# Patient Record
Sex: FEMALE | Race: WHITE | Employment: OTHER | URBAN - METROPOLITAN AREA
[De-identification: names, ages, dates, MRNs, and addresses within clinical notes are randomized per-mention and may not be internally consistent; named-entity substitution may affect disease eponyms.]

---

## 2017-04-06 PROBLEM — T50.901A OVERDOSE: Status: ACTIVE | Noted: 2017-04-06

## 2017-04-06 PROBLEM — J96.90 RESPIRATORY FAILURE (HCC): Status: ACTIVE | Noted: 2017-04-06

## 2017-04-06 PROBLEM — E87.20 METABOLIC ACIDOSIS: Status: ACTIVE | Noted: 2017-04-06

## 2017-07-08 ENCOUNTER — HOSPITAL ENCOUNTER (INPATIENT)
Age: 57
LOS: 3 days | Discharge: HOME | DRG: 885 | End: 2017-07-11
Attending: PSYCHIATRY & NEUROLOGY | Admitting: PSYCHIATRY & NEUROLOGY
Payer: COMMERCIAL

## 2017-07-08 PROCEDURE — 65220000003 HC RM SEMIPRIVATE PSYCH

## 2017-07-08 PROCEDURE — 74011250637 HC RX REV CODE- 250/637: Performed by: PSYCHIATRY & NEUROLOGY

## 2017-07-08 RX ORDER — LOSARTAN POTASSIUM 50 MG/1
100 TABLET ORAL DAILY
Status: DISCONTINUED | OUTPATIENT
Start: 2017-07-09 | End: 2017-07-11 | Stop reason: HOSPADM

## 2017-07-08 RX ORDER — IBUPROFEN 600 MG/1
600 TABLET ORAL
Status: DISCONTINUED | OUTPATIENT
Start: 2017-07-08 | End: 2017-07-11 | Stop reason: HOSPADM

## 2017-07-08 RX ORDER — FLUOXETINE HYDROCHLORIDE 20 MG/1
20 CAPSULE ORAL DAILY
Status: DISCONTINUED | OUTPATIENT
Start: 2017-07-09 | End: 2017-07-09

## 2017-07-08 RX ORDER — HYDROCHLOROTHIAZIDE 25 MG/1
12.5 TABLET ORAL DAILY
Status: DISCONTINUED | OUTPATIENT
Start: 2017-07-09 | End: 2017-07-11 | Stop reason: HOSPADM

## 2017-07-08 RX ORDER — BENZTROPINE MESYLATE 1 MG/1
1-2 TABLET ORAL
Status: DISCONTINUED | OUTPATIENT
Start: 2017-07-08 | End: 2017-07-11 | Stop reason: HOSPADM

## 2017-07-08 RX ORDER — LEVOTHYROXINE SODIUM 88 UG/1
88 TABLET ORAL
Status: DISCONTINUED | OUTPATIENT
Start: 2017-07-09 | End: 2017-07-11 | Stop reason: HOSPADM

## 2017-07-08 RX ORDER — CLONIDINE HYDROCHLORIDE 0.1 MG/1
0.1 TABLET ORAL 3 TIMES DAILY
Status: DISCONTINUED | OUTPATIENT
Start: 2017-07-08 | End: 2017-07-11 | Stop reason: HOSPADM

## 2017-07-08 RX ORDER — BENZTROPINE MESYLATE 1 MG/ML
1-2 INJECTION INTRAMUSCULAR; INTRAVENOUS
Status: DISCONTINUED | OUTPATIENT
Start: 2017-07-08 | End: 2017-07-11 | Stop reason: HOSPADM

## 2017-07-08 RX ORDER — HALOPERIDOL 5 MG/ML
5 INJECTION INTRAMUSCULAR
Status: DISCONTINUED | OUTPATIENT
Start: 2017-07-08 | End: 2017-07-11 | Stop reason: HOSPADM

## 2017-07-08 RX ORDER — HALOPERIDOL 5 MG/1
5 TABLET ORAL
Status: DISCONTINUED | OUTPATIENT
Start: 2017-07-08 | End: 2017-07-11 | Stop reason: HOSPADM

## 2017-07-08 RX ORDER — LORAZEPAM 2 MG/ML
1-2 INJECTION INTRAMUSCULAR
Status: DISCONTINUED | OUTPATIENT
Start: 2017-07-08 | End: 2017-07-11 | Stop reason: HOSPADM

## 2017-07-08 RX ORDER — TRAZODONE HYDROCHLORIDE 50 MG/1
50 TABLET ORAL
Status: DISCONTINUED | OUTPATIENT
Start: 2017-07-08 | End: 2017-07-09

## 2017-07-08 RX ORDER — LORAZEPAM 1 MG/1
1-2 TABLET ORAL
Status: DISCONTINUED | OUTPATIENT
Start: 2017-07-08 | End: 2017-07-09

## 2017-07-08 RX ORDER — ATORVASTATIN CALCIUM 20 MG/1
40 TABLET, FILM COATED ORAL
Status: DISCONTINUED | OUTPATIENT
Start: 2017-07-08 | End: 2017-07-11 | Stop reason: HOSPADM

## 2017-07-08 RX ADMIN — LORAZEPAM 1 MG: 1 TABLET ORAL at 20:39

## 2017-07-08 RX ADMIN — CLONIDINE HYDROCHLORIDE 0.1 MG: 0.1 TABLET ORAL at 20:39

## 2017-07-08 RX ADMIN — TRAZODONE HYDROCHLORIDE 50 MG: 50 TABLET ORAL at 21:13

## 2017-07-08 RX ADMIN — ATORVASTATIN CALCIUM 40 MG: 20 TABLET, FILM COATED ORAL at 20:39

## 2017-07-08 NOTE — BH NOTES
GROUP THERAPY PROGRESS NOTE    Kelsey Skiff is participating in Expressive   relaxation and Movie. Group time: 50 minutes    Personal goal for participation: Watching a Movie     Goal orientation: relaxation    Group therapy participation: active and minimal    Therapeutic interventions reviewed and discussed: She appeared to enjoy talking to her peers and socializing with her peers. Impression of participation: The above pt appeared to enjoy watching the movie with her peers during this group.

## 2017-07-08 NOTE — BH NOTES
Patient arrived to unit ambulatory alert and oriented X 4, accompanied by daughter and hospital . Patient searched for contrabands, patient rights was explained and she verbalized understanding. Patient was cooperative with admission process, patient's concern was about her increased level of anxiety daily and at night time, patient stated that she thought she could handle her increased anxiety by living alone, patient also stated that the medication that was prescribed at [facility] in April has not been effective and she weaning off it now to continue Prozac. Chantelle Diamond Patient denies thoughts to harm self or others and contracted to safety. Unit tour done and unit rules and guidelines explained, patient verbalized understanding. Patient was encouraged to utilize non slip socks for safety while ambulating, patient remained in the day area to watch TV for the remaining part of the shift. Will continue to monitor for safety and location.

## 2017-07-08 NOTE — IP AVS SNAPSHOT
[address] [place] [address] Patient: Lesley Zaldivar MRN: [id number] OXT:3/17/[id number] You are allergic to the following No active allergies Recent Documentation Height Weight BMI Smoking Status 1.626 m 79.4 kg 30.04 kg/m2 Never Smoker Emergency Contacts Name Discharge Info Relation Home Work Mobile Nohelia HERNANDEZ CAREGIVER [4] Child [2] [phone] Derik Mcneal  Boyfriend [17] [phone] Brigida Potts  Son [22] [phone] About your hospitalization You were admitted on:  July 8, 2017 You last received care in the:  [address] [facility] 1 ADULT CHEM DEP You were discharged on:  July 11, 2017 Unit phone number:  [phone] Why you were hospitalized Your primary diagnosis was: Major Depressive Disorder, Recurrent Episode (Hcc) Providers Seen During Your Hospitalizations Provider Role Specialty Primary office phone Gopi Solis MD Attending Provider Psychiatry [phone] Your Primary Care Physician (PCP) Primary Care Physician Office Phone Office Fax Memory N[facility] [address] Follow-up Information Follow up With Details Comments Contact Info Ayo Lewis, [address] [facility] [address] 
[phone] [address] [place] [address] On 7/13/2017 at 1:00 pm with Ms. Becerra Christiano LOPEZ for medication [address] [place] [phone] 
[phone] [address] [place] [address] On 7/21/2017 at 1:00 pm for therapy with Ms. Gus Castle [address] [place] [phone] 
[phone] Current Discharge Medication List  
  
START taking these medications Dose & Instructions Dispensing Information Comments Morning Noon Evening Bedtime hydrOXYzine pamoate 50 mg capsule Commonly known as:  VISTARIL Your last dose was: Your next dose is:    
   
   
 Dose:  50 mg Take 1 Cap by mouth three (3) times daily as needed for Anxiety. Indications: anxiety Quantity:  90 Cap Refills:  0 CONTINUE these medications which have CHANGED Dose & Instructions Dispensing Information Comments Morning Noon Evening Bedtime FLUoxetine 40 mg capsule Commonly known as:  PROzac What changed:   
- medication strength 
- how much to take Your last dose was: Your next dose is:    
   
   
 Dose:  40 mg Take 1 Cap by mouth daily. Indications: depression Quantity:  30 Cap Refills:  0  
     
   
   
   
  
 traZODone 150 mg tablet Commonly known as:  Rayma Medal What changed:   
- medication strength 
- how much to take - when to take this 
- reasons to take this Your last dose was: Your next dose is:    
   
   
 Dose:  150 mg Take 1 Tab by mouth nightly as needed for Sleep. Indications: insomnia associated with depression Quantity:  30 Tab Refills:  0 CONTINUE these medications which have NOT CHANGED Dose & Instructions Dispensing Information Comments Morning Noon Evening Bedtime  
 atorvastatin 40 mg tablet Commonly known as:  LIPITOR Your last dose was: Your next dose is:    
   
   
 Dose:  40 mg Take 40 mg by mouth daily. Refills:  0  
     
   
   
   
  
 cloNIDine HCl 0.1 mg tablet Commonly known as:  CATAPRES Your last dose was: Your next dose is:    
   
   
 Dose:  0.1 mg Take 0.1 mg by mouth three (3) times daily. Refills:  0  
     
   
   
   
  
 levothyroxine 88 mcg tablet Commonly known as:  SYNTHROID Your last dose was: Your next dose is:    
   
   
 Dose:  88 mcg Take 88 mcg by mouth Daily (before breakfast). Refills:  0 losartan-hydroCHLOROthiazide 100-12.5 mg per tablet Commonly known as:  HYZAAR Your last dose was: Your next dose is:    
   
   
 Dose:  1 Tab Take 1 Tab by mouth daily. Refills:  0 STOP taking these medications   
 escitalopram oxalate 10 mg tablet Commonly known as:  Jennifer Holt Where to Get Your Medications Information on where to get these meds will be given to you by the nurse or doctor. ! Ask your nurse or doctor about these medications FLUoxetine 40 mg capsule  
 hydrOXYzine pamoate 50 mg capsule  
 traZODone 150 mg tablet Discharge Instructions Your health and safety is very important to us; we remind you to commit to safety and recovery. Should you have any thoughts of harming yourself or others please dial 911, utilize your support systems, the coping strategies you have learned as well as the [address] [place] [address] at [phone] or visit their website at [email or web address]/ The following are some additional coping strategies that you can utilize if you start to feel anxious, angry, stressed or depressed 1. Exercise (running, walking, etc.). 2. Write (poetry, stories, journal). 3. Be with other people. 4. Watch a favorite TV show. 5. Practice Mindfulness 6. Watch a funny/favorite movie 7. Do some deep breathing 8. Take a hot shower or relaxing bath. 9. Play with a pet. 10. Help others 11. Read a good book. 12. Listen to music. 13. Try some aromatherapy (candle, lotion, room spray). 14. Meditate. 15. Paint or draw. 16. Rip paper into itty-bitty pieces. 17. Shoot hoops, kick a ball. 18. Hug a pillow or stuffed animal. 
19. Dance. 20. Take up a new hobby. 21. [place]. 25. Make a list of blessings in your life. 23. Contact a hotline/ your therapist. 
24. Talk to someone close to you. 25. Yoga. 32. Martha Cordova a friend or family member. 32. Make a list of goals for the week/month/year/5 years. BEHAVIORAL HEALTH NURSING DISCHARGE NOTE PATIENT INSTRUCTIONS: 
 
Diet: Regular The discharge information has been reviewed with the patient. The patient verbalized understanding. Patient armband removed and shredded. Discharge Orders None Introducing [facility]! Devorah Schmitt introduces Nonp[employer]il patient portal. Now you can access parts of your medical record, email your doctor's office, and request medication refills online. 1. In your internet browser, go to [email or web address] 2. Click on the First Time User? Click Here link in the Sign In box. You will see the New Member Sign Up page. 3. Enter your Nonpareil Access Code exactly as it appears below. You will not need to use this code after youve completed the sign-up process. If you do not sign up before the expiration date, you must request a new code. · Non[employer]il Access Code: [email or web address] Expires: 10/8/2017 11:24 AM 
 
4. Enter the last four digits of your Social Security Number (xxxx) and Date of Birth (mm/dd/yyyy) as indicated and click Submit. You will be taken to the next sign-up page. 5. Create a Nonpareil ID. This will be your Nonpareil login ID and cannot be changed, so think of one that is secure and easy to remember. 6. Create a Nonpareil password. You can change your password at any time. 7. Enter your Password Reset Question and Answer. This can be used at a later time if you forget your password. 8. Enter your e-mail address. You will receive e-mail notification when new information is available in [address] 9. Click Sign Up. You can now view and download portions of your medical record. 10. Click the Download Summary menu link to download a portable copy of your medical information. If you have questions, please visit the Frequently Asked Questions section of the Nonpareil website.  Remember, Nonp[employer]il is NOT to be used for urgent needs. For medical emergencies, dial 911. Now available from your iPhone and Android! General Information Please provide this summary of care documentation to your next provider. Patient Signature:  ____________________________________________________________ Date:  ____________________________________________________________  
  
Fayrene Retort Provider Signature:  ____________________________________________________________ Date:  ____________________________________________________________

## 2017-07-09 PROBLEM — F33.9 MAJOR DEPRESSIVE DISORDER, RECURRENT EPISODE (HCC): Status: ACTIVE | Noted: 2017-07-09

## 2017-07-09 PROCEDURE — 74011250637 HC RX REV CODE- 250/637: Performed by: PSYCHIATRY & NEUROLOGY

## 2017-07-09 PROCEDURE — 65220000003 HC RM SEMIPRIVATE PSYCH

## 2017-07-09 RX ORDER — FLUOXETINE HYDROCHLORIDE 20 MG/1
20 CAPSULE ORAL DAILY
Status: COMPLETED | OUTPATIENT
Start: 2017-07-09 | End: 2017-07-09

## 2017-07-09 RX ORDER — HYDROXYZINE PAMOATE 50 MG/1
50 CAPSULE ORAL
Status: DISCONTINUED | OUTPATIENT
Start: 2017-07-09 | End: 2017-07-11 | Stop reason: HOSPADM

## 2017-07-09 RX ORDER — FLUOXETINE HYDROCHLORIDE 20 MG/1
40 CAPSULE ORAL DAILY
Status: DISCONTINUED | OUTPATIENT
Start: 2017-07-10 | End: 2017-07-11 | Stop reason: HOSPADM

## 2017-07-09 RX ADMIN — ATORVASTATIN CALCIUM 40 MG: 20 TABLET, FILM COATED ORAL at 20:21

## 2017-07-09 RX ADMIN — FLUOXETINE HYDROCHLORIDE 20 MG: 20 CAPSULE ORAL at 09:00

## 2017-07-09 RX ADMIN — LEVOTHYROXINE SODIUM 88 MCG: 88 TABLET ORAL at 06:52

## 2017-07-09 RX ADMIN — LORAZEPAM 1 MG: 1 TABLET ORAL at 04:34

## 2017-07-09 RX ADMIN — FLUOXETINE 20 MG: 20 CAPSULE ORAL at 11:21

## 2017-07-09 RX ADMIN — LOSARTAN POTASSIUM 100 MG: 50 TABLET, FILM COATED ORAL at 08:20

## 2017-07-09 RX ADMIN — HYDROCHLOROTHIAZIDE 12.5 MG: 25 TABLET ORAL at 08:20

## 2017-07-09 RX ADMIN — CLONIDINE HYDROCHLORIDE 0.1 MG: 0.1 TABLET ORAL at 20:21

## 2017-07-09 RX ADMIN — CLONIDINE HYDROCHLORIDE 0.1 MG: 0.1 TABLET ORAL at 08:18

## 2017-07-09 RX ADMIN — CLONIDINE HYDROCHLORIDE 0.1 MG: 0.1 TABLET ORAL at 14:57

## 2017-07-09 RX ADMIN — TRAZODONE HYDROCHLORIDE 150 MG: 100 TABLET ORAL at 22:07
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
## 2017-07-09 NOTE — BH NOTES
GROUP THERAPY PROGRESS NOTE    Stiven Farrell is participating in Recreational Therapy. Group time: 80    Personal goal for participation: fresh air break or recreation on the unit    Goal orientation: social    Group therapy participation: active    Therapeutic interventions reviewed and discussed: Staff encouraged Pt to get off the unit and go outside and get some fresh air, or play games on the unit with peers. Impression of participation:  Pt. chose to stay on the unit, play games with peers, color joshua patterns or watch a movie.

## 2017-07-09 NOTE — BH NOTES
SHAYLA Notes: pt was in complaint with vitals, med's, and  Ate 100% of meals. Pt participated in groups shift and was able to express her goals. Pt was isolated upon approach and will communicate upon approach. Pt was not a management problem and will continue to monitored for safety precautions and location.

## 2017-07-09 NOTE — BH NOTES
GROUP THERAPY PROGRESS NOTE    Jaclyn Martin is participating in [place].      Group time: 15 minutes    Personal goal for participation: talk w/md      Goal orientation: community    Group therapy participation: active    Therapeutic interventions reviewed and discussed: rules and regulation

## 2017-07-09 NOTE — H&P
History and Physical        Patient: Kelsey Skiff               Sex: female          DOA: 7/8/2017         YOB: 1960      Age:  59 y.o.        LOS:  LOS: 1 day        HPI:     Kelsey Skiff is a 59 y.o. female who was admitted experiencing depression and suicidal ideation after being found with a knife threatening to kill herself. Principal Problem:    Major depressive disorder, recurrent episode ([address] (7/9/2017)        Past Medical History:   Diagnosis Date    Hypercholesteremia     Hypertension     Psychiatric disorder        No past surgical history on file. No family history on file. Social History     Social History    Marital status:      Spouse name: N/A    Number of children: 2    Years of education:      Social History Main Topics    Smoking status: Never Smoker    Smokeless tobacco: Never Used    Alcohol use Yes    Drug use: No    Sexual activity: Not on file     Other Topics Concern    Not on file     Social History Narrative    Patient states she lives with her significant other. States her appetite and sleep are poor. She is a retired . Prior to Admission medications    Medication Sig Start Date End Date Taking? Authorizing Provider   losartan-hydroCHLOROthiazide (HYZAAR) 100-12.5 mg per tablet Take 1 Tab by mouth daily. Jay Sim MD   FLUoxetine (PROZAC) 20 mg capsule Take 20 mg by mouth daily. Jay Sim MD   atorvastatin (LIPITOR) 40 mg tablet Take 40 mg by mouth daily. Jay Sim MD   cloNIDine HCl (CATAPRES) 0.1 mg tablet Take 0.1 mg by mouth three (3) times daily. Jay Sim MD   traZODone (DESYREL) 50 mg tablet Take 50 mg by mouth nightly. Jay Sim MD   escitalopram oxalate (LEXAPRO) 10 mg tablet Take 10 mg by mouth daily. Jay Sim MD   levothyroxine (SYNTHROID) 88 mcg tablet Take 88 mcg by mouth Daily (before breakfast).     Jay Sim MD       No Known Allergies    Review of Systems  A comprehensive review of systems was negative. Requesting referral for eye/vision care. Physical Exam:      Visit Vitals    BP (!) 137/97    Pulse 85    Temp 97.3 °F (36.3 °C)    Resp 15    Ht 5' 4\" (1.626 m)    Wt 175 lb (79.4 kg)    BMI 30.04 kg/m2       Physical Exam:    General:  Alert, cooperative, well developed, well nourished,  female, no distress, appears stated age. Eyes:  Conjunctivae/corneas clear. PERRL, EOMs intact. Fundi benign   Ears:  Normal TMs and external ear canals both ears. Nose: Nares normal. Septum midline. Mucosa normal. No drainage or sinus tenderness. Mouth/Throat: Lips, mucosa, and tongue normal. Teeth and gums normal.   Neck: Supple, symmetrical, trachea midline, no adenopathy, thyroid: no enlargement/tenderness/nodules, no carotid bruit and no JVD. Back:   Symmetric, no curvature. ROM normal. No CVA tenderness. Lungs:   Clear to auscultation bilaterally. Heart:  Regular rate and rhythm, S1, S2 normal, no murmur, click, rub or gallop. Abdomen:   Soft, non-tender. Bowel sounds normal. No masses,  No organomegaly. Extremities: Extremities normal, atraumatic, no cyanosis or edema. Pulses: 2+ and symmetric all extremities. Skin: Skin color, texture, turgor normal. No rashes or lesions   Lymph nodes: Cervical, supraclavicular, and axillary nodes normal.   Neurologic: CNII-XII intact. Normal strength, sensation and reflexes throughout.              Assessment/Plan     Depression  Suicidal ideation  Hopeless  Labs reviewed  Continue treatment per physician's orders

## 2017-07-10 LAB — TSH SERPL DL<=0.05 MIU/L-ACNC: 2.25 UIU/ML (ref 0.36–3.74)

## 2017-07-10 PROCEDURE — 65220000003 HC RM SEMIPRIVATE PSYCH

## 2017-07-10 PROCEDURE — 84443 ASSAY THYROID STIM HORMONE: CPT | Performed by: PSYCHIATRY & NEUROLOGY

## 2017-07-10 PROCEDURE — 74011250637 HC RX REV CODE- 250/637: Performed by: PSYCHIATRY & NEUROLOGY

## 2017-07-10 PROCEDURE — 36415 COLL VENOUS BLD VENIPUNCTURE: CPT | Performed by: PSYCHIATRY & NEUROLOGY

## 2017-07-10 RX ADMIN — FLUOXETINE 40 MG: 20 CAPSULE ORAL at 08:21

## 2017-07-10 RX ADMIN — HYDROXYZINE PAMOATE 50 MG: 50 CAPSULE ORAL at 17:30

## 2017-07-10 RX ADMIN — ATORVASTATIN CALCIUM 40 MG: 20 TABLET, FILM COATED ORAL at 20:36

## 2017-07-10 RX ADMIN — TRAZODONE HYDROCHLORIDE 150 MG: 100 TABLET ORAL at 20:44

## 2017-07-10 RX ADMIN — CLONIDINE HYDROCHLORIDE 0.1 MG: 0.1 TABLET ORAL at 20:36

## 2017-07-10 RX ADMIN — CLONIDINE HYDROCHLORIDE 0.1 MG: 0.1 TABLET ORAL at 13:44

## 2017-07-10 RX ADMIN — HYDROXYZINE PAMOATE 50 MG: 50 CAPSULE ORAL at 11:34

## 2017-07-10 RX ADMIN — HYDROXYZINE PAMOATE 50 MG: 50 CAPSULE ORAL at 01:05

## 2017-07-10 RX ADMIN — LOSARTAN POTASSIUM 100 MG: 50 TABLET, FILM COATED ORAL at 08:21

## 2017-07-10 RX ADMIN — CLONIDINE HYDROCHLORIDE 0.1 MG: 0.1 TABLET ORAL at 08:22

## 2017-07-10 RX ADMIN — HYDROCHLOROTHIAZIDE 12.5 MG: 25 TABLET ORAL at 08:20

## 2017-07-10 RX ADMIN — LEVOTHYROXINE SODIUM 88 MCG: 88 TABLET ORAL at 06:34

## 2017-07-10 NOTE — BSMART NOTE
SW ENCOUNTER: The SW and doctor met with the patient to assess progress and needs. The patient expressed that she has been having difficulty with sleeping due to not being in a peaceful environment. The patient was unsure of the effectiveness of her medications; the doctor addressed her concerns. The patient plans to return home upon discharged and requested a need for outpatient therapy. The patient denied current SI/HI, intent and AVH.

## 2017-07-10 NOTE — PROGRESS NOTES
Problem: Suicide/Homicide (Adult/Pediatric)  Goal: *STG: Remains safe in hospital  Patient will remain safe daily while hospitalized. Outcome: Progressing Towards Goal  Patient demonstrated safe behavior; monitored for safety per protocol. Goal: *STG/LTG: Complies with medication therapy  Outcome: Progressing Towards Goal  Patient is medication compliant. Problem: Falls - Risk of  Goal: *Absence of falls  Outcome: Progressing Towards Goal  Patient remains free from falls. Comments:   Patient is alert and oriented x 4; pleasant and cooperative; denied thoughts of harm to self or others; contracts for safety; stated that she's here d/t to anxiety and sleeplessness; anxious affect and mood; stated that she had a good visit with her significant other this evening; with continue to monitor and maintain safe environment.

## 2017-07-10 NOTE — BH NOTES
Patient cooperative, pleasant and calm through the shift, denies thoughts to harm self or others. Patient stated that she was a little worried when the  mentioned to her about not having health insurance coverage. Patient also stated that that she will talk to her daughter about getting the insurance cards from home. Patient had a good visiting time with daughter, she interacted with peers and staff, ate 100% dinner, participated in group, received medications as prescribed, utilized non slip footwear for safety.  Will continue to monitor

## 2017-07-10 NOTE — PROGRESS NOTES
Problem: Suicide/Homicide (Adult/Pediatric)  Goal: *STG: Remains safe in hospital  Patient will remain safe daily while hospitalized. Outcome: Progressing Towards Goal  No self-injurious or aggressive behaviors  Goal: *STG/LTG: Complies with medication therapy  Outcome: Progressing Towards Goal  Compliant with medications    Problem: Falls - Risk of  Goal: *Absence of falls  Outcome: Progressing Towards Goal  No falls reported/observed    Comments:   Patient has been interacting well with staff, nurses, and peers. Denied SI/HI. Denies AVH. She stated she wasn't able to sleep last night and requested to change rooms; this move was accommodated to encourage effective sleep. She was at the nurses station several times in the morning asking about the room change but once it was changed she went to her room to sleep. She will remain on suicide precautions. Staff will monitor patient q15 minutes for safety. Staff and nurses will continue to provide a safe and supportive environment.

## 2017-07-10 NOTE — PROGRESS NOTES
[address] [facility] [address]  Inpatient Progress Note     Date of Service: 07/10/17  Hospital Day: 2     Subjective/Interval History   07/10/17    Treatment Team Notes:  Notes reviewed and/or discussed and report that Charles Tapia is behaviorally appropriate. She received Vistaril due to having a roommate who snores. Pt is a light sleeper. Patient interview: Charles Tapia was interviewed by this writer today. Pt reported feeling better. She is tired due to poor sleep. Pt denies any SI this morning. She denies any side effects to her increased medication regimen. Pt is hopeful for a brief hospitalization. No other interval concerns. Objective     Vitals:    07/09/17 0832 07/09/17 1400 07/09/17 2021 07/10/17 0820   BP: (!) 137/97 101/72 157/71 121/85   Pulse: 85 75 69 76   Resp: 15 16     Temp: 97.3 °F (36.3 °C) 96.9 °F (36.1 °C)     Weight:       Height:           Mental Status Examination     The patient is a 62-year- old  female who appears stated age. She wears glasses. Her behaviors are calmer, non-aggressive and cooperative. The  patient's speech is appropriate. Mood and affect are euthymic. Thought  process is linear. Thought content is absent for any suicidal ideations,  homicidal ideations, auditory hallucinations, or visual hallucinations. The patient is alert and oriented x4 with intact cognition. Insight and  judgement are improving. Assessment/Plan      PSYCHIATRIC DIAGNOSES  1. Major depressive disorder, recurrent, severe, without psychotic  features. 2. Unspecified anxiety disorder.     MEDICAL DIAGNOSES  1. Hypertension. 2. Hypercholesterolemia. 3. Hypothyroidism. 4. Insomnia.     PSYCHOSOCIAL AND CONTEXTUAL FACTORS: Grieving the loss of  her father. Family history significant of suicide attempts. Feeling  depressed after [facility].     LEVEL OF IMPAIRMENT OR DISABILITY: Severe.     The patient is a 53-year-old  female who is stabilizing.      1. Continue Prozac 40 mg daily. 3. Continue Vistaril 50 mg p.o. q.i.d. p.r.n. anxiety. 4. Continue trazodone 150 mg nightly p.r.n. for insomnia.   6.  will assist in identifying individual therapy and  medication management in the [place] area.      Tentative date of discharge: 2-4 days    Peggy Fraser MD  [facility]  Psychiatry

## 2017-07-10 NOTE — BSMART NOTE
OCCUPATIONAL THERAPY PROGRESS NOTE  Group Time:  9042  Attendance: The patient attended full group. Participation:  The patient participated with moderate elaboration in the activity. Attention:  The patient was able to focus on the activity. Interaction:  The patient acknowledges others or responds to questions,  with no spontaneous interaction. Limited disclosure about issues, changes to work on.

## 2017-07-11 VITALS
WEIGHT: 175 LBS | SYSTOLIC BLOOD PRESSURE: 109 MMHG | BODY MASS INDEX: 29.88 KG/M2 | OXYGEN SATURATION: 99 % | TEMPERATURE: 97.6 F | HEART RATE: 77 BPM | RESPIRATION RATE: 18 BRPM | HEIGHT: 64 IN | DIASTOLIC BLOOD PRESSURE: 76 MMHG

## 2017-07-11 PROCEDURE — 74011250637 HC RX REV CODE- 250/637: Performed by: PSYCHIATRY & NEUROLOGY

## 2017-07-11 RX ORDER — HYDROXYZINE PAMOATE 50 MG/1
50 CAPSULE ORAL
Qty: 90 CAP | Refills: 0 | Status: SHIPPED | OUTPATIENT
Start: 2017-07-11 | End: 2017-08-17

## 2017-07-11 RX ORDER — FLUOXETINE HYDROCHLORIDE 40 MG/1
40 CAPSULE ORAL DAILY
Qty: 30 CAP | Refills: 0 | Status: SHIPPED | OUTPATIENT
Start: 2017-07-11 | End: 2017-11-14

## 2017-07-11 RX ORDER — TRAZODONE HYDROCHLORIDE 150 MG/1
150 TABLET ORAL
Qty: 30 TAB | Refills: 0 | Status: SHIPPED | OUTPATIENT
Start: 2017-07-11 | End: 2017-08-17

## 2017-07-11 RX ADMIN — CLONIDINE HYDROCHLORIDE 0.1 MG: 0.1 TABLET ORAL at 08:21

## 2017-07-11 RX ADMIN — HYDROXYZINE PAMOATE 50 MG: 50 CAPSULE ORAL at 03:26

## 2017-07-11 RX ADMIN — HYDROXYZINE PAMOATE 50 MG: 50 CAPSULE ORAL at 10:57

## 2017-07-11 RX ADMIN — LEVOTHYROXINE SODIUM 88 MCG: 88 TABLET ORAL at 06:35

## 2017-07-11 RX ADMIN — HYDROCHLOROTHIAZIDE 12.5 MG: 25 TABLET ORAL at 08:21

## 2017-07-11 RX ADMIN — CLONIDINE HYDROCHLORIDE 0.1 MG: 0.1 TABLET ORAL at 13:38

## 2017-07-11 RX ADMIN — FLUOXETINE 40 MG: 20 CAPSULE ORAL at 08:21

## 2017-07-11 RX ADMIN — LOSARTAN POTASSIUM 100 MG: 50 TABLET, FILM COATED ORAL at 08:21

## 2017-07-11 NOTE — BH NOTES
Patient aware of follow-up appointments. Patient signed discharge instructions, has card with number to remember, and prescriptions. Patient aware her discharge is ready but patient stated she would have to wait until her boyfriend gets off of work and will pick her up at about 5:30 pm. Patient stated she slept much better last night and denied SI/HI/AVH.

## 2017-07-11 NOTE — BH NOTES
0147 patient requested and received vistaril 50 mg PO for restlessness and anxiety. Patient went back to bed after taking medication.

## 2017-07-11 NOTE — BH NOTES
GROUP THERAPY PROGRESS NOTE    Kendal Saini is participating in [place]. Group time: 30 minutes    Personal goal for participation: discussed unit guidelines and importance of participation in treatment while here. Goal orientation: community    Group therapy participation: minimal    Therapeutic interventions reviewed and discussed:     Impression of participation: compliant with participation and voiced no major concerns.

## 2017-07-11 NOTE — BSMART NOTE
SW COLLATERAL: The patient has a appointment at [facility] on 7/13/17 at 1 pm with Ms. Nena LOPEZ for medication and 7/21/17 at 1 pm for therapy with Ms. Charlane Sever. The address and contact number is [address]; Phone: [phone]; Fax: [phone].

## 2017-07-11 NOTE — BSMART NOTE
SW ENCOUNTER: The SW and doctor met with the patient whom stated that she was feeling much better especially since she was able to get quality sleep. She denied current SI/HI, intent, AVH and concerns regarding her medications, health and safety. The patient expressed readiness for discharge; spoke with her significant other about him moving in and being more supportive and assertive with her. She was encouraged to comply with her prescribed medication regime and to follow-up with her outpatient providers.

## 2017-07-11 NOTE — DISCHARGE INSTRUCTIONS
Your health and safety is very important to us; we remind you to commit to safety and recovery. Should you have any thoughts of harming yourself or others please dial 911, utilize your support systems, the coping strategies you have learned as well as the [address] at [phone] or visit their website at https://suicidepreventionlifeline.org/    The following are some additional coping strategies that you can utilize if you start to feel anxious, angry, stressed or depressed    1. Exercise (running, walking, etc.). 2. Write (poetry, stories, journal). 3. Be with other people. 4. Watch a favorite TV show. 5. Practice Mindfulness  6. Watch a funny/favorite movie  7. Do some deep breathing  8. Take a hot shower or relaxing bath. 9. Play with a pet. 10. Help others  11. Read a good book. 12. Listen to music. 13. Try some aromatherapy (candle, lotion, room spray). 14. Meditate. 15. Paint or draw. 16. Rip paper into itty-bitty pieces. 17. Shoot hoops, kick a ball. 18. Hug a pillow or stuffed animal.  19. Dance. 20. Take up a new hobby. 21. [place]. 25. Make a list of blessings in your life. 23. Contact a hotline/ your therapist.  24. Talk to someone close to you. 25. Yoga. 32. [place] a friend or family member. 32. Make a list of goals for the week/month/year/5 years. BEHAVIORAL HEALTH NURSING DISCHARGE NOTE      PATIENT INSTRUCTIONS:    Diet: Regular    The discharge information has been reviewed with the patient. The patient verbalized understanding. Patient armband removed and shredded.

## 2017-07-11 NOTE — PROGRESS NOTES
[facility] [address]  Inpatient Progress Note     Date of Service: 07/11/17  Hospital Day: 3     Subjective/Interval History   07/11/17    Treatment Team Notes:  Notes reviewed and/or discussed and report that Kirtland Paget med compliant. Had positive visit. Received Vistaril for insomnia. Patient interview: Kirtland Paget was interviewed by this writer today. Pt slept well last night with room assignment changes. She reports that her mood is improving. Denies SI. Says that boyfriend plans to move in to offer additional support. Denies access to weapons at home. Objective     Vitals:    07/10/17 0820 07/10/17 1344 07/10/17 2044 07/11/17 0821   BP: 121/85 112/76 117/70 133/88   Pulse: 76 81  65   Resp:  16  18   Temp:  97.8 °F (36.6 °C) 97.9 °F (36.6 °C) 97.1 °F (36.2 °C)   SpO2:  98%     Weight:       Height:           Mental Status Examination     The patient is a 62-year- old  female who appears stated age. She wears glasses. Her behaviors are calmer, non-aggressive and cooperative. The  patient's speech is appropriate. Mood and affect are euthymic. Thought process is linear. Thought content is absent for any suicidal ideations,  homicidal ideations, auditory hallucinations, or visual hallucinations. The patient is alert and oriented x4 with intact cognition. Insight and judgement are improving. Assessment/Plan      PSYCHIATRIC DIAGNOSES  1. Major depressive disorder, recurrent, severe, without psychotic  features. 2. Unspecified anxiety disorder.     MEDICAL DIAGNOSES  1. Hypertension. 2. Hypercholesterolemia. 3. Hypothyroidism. 4. Insomnia.     PSYCHOSOCIAL AND CONTEXTUAL FACTORS: Grieving the loss of  her father. Family history significant of suicide attempts. Feeling  depressed after [facility].     LEVEL OF IMPAIRMENT OR DISABILITY: moderate     The patient is a 49-year-old  female who is stabilizing.      1. Continue Prozac 40 mg daily.   3. Continue Vistaril 50 mg p.o. q.i.d. p.r.n. anxiety. 4. Continue trazodone 150 mg nightly p.r.n. for insomnia.   6.  will assist in identifying individual therapy and  medication management in the [place] area.      Tentative date of discharge: today    Lucia Fagan MD  [facility]  Psychiatry

## 2017-07-14 NOTE — DISCHARGE SUMMARY
[address] [place] SUMMARY    Name:  Rula Fabian  MR#:  [id number]  :  1960  Account #:  [de-identified]  Date of Adm:  2017  Date of Discharge:  2017  3:25 PM      PSYCHIATRIC DIAGNOSES:  1. Major depressive disorder, recurrent, severe, without psychotic  symptoms. 2. Unspecified anxiety disorder. MEDICAL DIAGNOSES:  1. Hypertension. 2. Hypercholesterolemia. 3. Hypothyroidism. 4. Insomnia. PSYCHOSOCIAL AND CONTEXTUAL FACTORS:  1. Grieving loss of her father. 2. History of family suicidal attempts. 3. Feeling depressed after [facility]. LEVEL OF IMPAIRMENT OR DISABILITY: Moderate. HOSPITAL COURSE: The patient is a 64-year-old  female  with a history of depression who presented under temporary  detainment order by way of [facility] after  reporting suicidal ideation. On initial assessment by the treatment team the patient stated that a  combination of grieving the loss of family members, as well as  adjusting to penitentiary as contributing to her increased depression. For management of her ongoing depression and anxiety, her home  dosage of Prozac 20 mg was increased to 40 mg daily. She was also  given Vistaril 50 mg t.i.d. p.r.n. for anxiety, as well as trazodone nightly  for insomnia. The patient reported that with these medication  adjustments that she experienced reduced depression and anxiety  symptoms. Also, she stated that she was able to contact her boyfriend  who will be an additional resource for the patient beyond discharge. The patient denied any access to weapons at time of release from the  facility. DISCHARGE MEDICATION LIST:  1. Prozac 40 mg p.o. daily. 2. Vistaril 50 mg p.o. t.i.d. for anxiety. 3. Trazodone 150 mg p.o. nightly for insomnia. FOLLOWUP: [id number] on 2017, at  1 p.m. with Ms. Ivon Friend for medication management. Also seeing  Ms. Chana Lincoln Yolanda for therapy on 07/21/2017 at 1 p.m.    DISPOSITION: Home. DISCHARGE MENTAL STATUS EXAMINATION: The patient is a 59  year-old  female who appears stated age. She wears  glasses. Her behaviors are nonaggressive, calmer and cooperative. Speech is appropriate. Mood and affect are euthymic. Thought process  is linear. Thought content is absent for any suicidal or homicidal  ideations, auditory or visual hallucinations. Her insight and judgment  are improving.         MD Glendy Vazquez / Abi Ibarra  D:  07/13/2017   12:28  T:  07/14/2017   19:17  Job #:  [id number]

## 2020-01-22 ENCOUNTER — APPOINTMENT (RX ONLY)
Dept: URBAN - METROPOLITAN AREA CLINIC 56 | Facility: CLINIC | Age: 60
Setting detail: DERMATOLOGY
End: 2020-01-22

## 2020-01-22 DIAGNOSIS — L82.1 OTHER SEBORRHEIC KERATOSIS: ICD-10-CM

## 2020-01-22 DIAGNOSIS — L20.89 OTHER ATOPIC DERMATITIS: ICD-10-CM

## 2020-01-22 DIAGNOSIS — F10.99 ALCOHOL USE, UNSPECIFIED WITH UNSPECIFIED ALCOHOL-INDUCED DISORDER: ICD-10-CM

## 2020-01-22 DIAGNOSIS — L81.4 OTHER MELANIN HYPERPIGMENTATION: ICD-10-CM

## 2020-01-22 DIAGNOSIS — D18.0 HEMANGIOMA: ICD-10-CM

## 2020-01-22 PROBLEM — L20.84 INTRINSIC (ALLERGIC) ECZEMA: Status: ACTIVE | Noted: 2020-01-22

## 2020-01-22 PROBLEM — D18.01 HEMANGIOMA OF SKIN AND SUBCUTANEOUS TISSUE: Status: ACTIVE | Noted: 2020-01-22

## 2020-01-22 PROCEDURE — 99202 OFFICE O/P NEW SF 15 MIN: CPT

## 2020-01-22 PROCEDURE — ? FULL BODY SKIN EXAM

## 2020-01-22 PROCEDURE — ? COUNSELING

## 2020-01-22 PROCEDURE — ? PRESCRIPTION

## 2020-01-22 RX ORDER — TRIAMCINOLONE ACETONIDE 1 MG/G
CREAM TOPICAL BID
Qty: 1 | Refills: 1 | Status: ERX | COMMUNITY
Start: 2020-01-22

## 2020-01-22 RX ADMIN — TRIAMCINOLONE ACETONIDE: 1 CREAM TOPICAL at 00:00

## 2020-01-22 ASSESSMENT — LOCATION SIMPLE DESCRIPTION DERM
LOCATION SIMPLE: LEFT FOREARM
LOCATION SIMPLE: RIGHT FOREARM

## 2020-01-22 ASSESSMENT — LOCATION ZONE DERM: LOCATION ZONE: ARM

## 2020-01-22 ASSESSMENT — LOCATION DETAILED DESCRIPTION DERM
LOCATION DETAILED: RIGHT DISTAL DORSAL FOREARM
LOCATION DETAILED: LEFT DISTAL DORSAL FOREARM